# Patient Record
Sex: MALE | Race: WHITE
[De-identification: names, ages, dates, MRNs, and addresses within clinical notes are randomized per-mention and may not be internally consistent; named-entity substitution may affect disease eponyms.]

---

## 2018-10-29 ENCOUNTER — RX RENEWAL (OUTPATIENT)
Age: 68
End: 2018-10-29

## 2018-10-29 PROBLEM — Z00.00 ENCOUNTER FOR PREVENTIVE HEALTH EXAMINATION: Status: ACTIVE | Noted: 2018-10-29

## 2018-12-17 ENCOUNTER — RX RENEWAL (OUTPATIENT)
Age: 68
End: 2018-12-17

## 2019-03-19 ENCOUNTER — RECORD ABSTRACTING (OUTPATIENT)
Age: 69
End: 2019-03-19

## 2019-03-19 DIAGNOSIS — Z78.9 OTHER SPECIFIED HEALTH STATUS: ICD-10-CM

## 2019-03-19 DIAGNOSIS — Z86.79 PERSONAL HISTORY OF OTHER DISEASES OF THE CIRCULATORY SYSTEM: ICD-10-CM

## 2019-03-19 DIAGNOSIS — Z82.5 FAMILY HISTORY OF ASTHMA AND OTHER CHRONIC LOWER RESPIRATORY DISEASES: ICD-10-CM

## 2019-03-19 RX ORDER — ADHESIVE TAPE 3"X 2.3 YD
50 MCG TAPE, NON-MEDICATED TOPICAL
Refills: 0 | Status: ACTIVE | COMMUNITY

## 2019-03-19 RX ORDER — ASPIRIN 81 MG
81 TABLET, DELAYED RELEASE (ENTERIC COATED) ORAL
Refills: 0 | Status: ACTIVE | COMMUNITY

## 2019-03-20 ENCOUNTER — APPOINTMENT (OUTPATIENT)
Dept: ENDOCRINOLOGY | Facility: CLINIC | Age: 69
End: 2019-03-20
Payer: COMMERCIAL

## 2019-03-20 VITALS
HEART RATE: 74 BPM | SYSTOLIC BLOOD PRESSURE: 140 MMHG | WEIGHT: 229 LBS | HEIGHT: 68 IN | BODY MASS INDEX: 34.71 KG/M2 | DIASTOLIC BLOOD PRESSURE: 70 MMHG

## 2019-03-20 DIAGNOSIS — Z87.448 PERSONAL HISTORY OF OTHER DISEASES OF URINARY SYSTEM: ICD-10-CM

## 2019-03-20 DIAGNOSIS — Z85.9 PERSONAL HISTORY OF MALIGNANT NEOPLASM, UNSPECIFIED: ICD-10-CM

## 2019-03-20 LAB
CHOLEST SERPL-MCNC: 152
GLUCOSE BLDC GLUCOMTR-MCNC: 248
HBA1C MFR BLD HPLC: 10.5
LDLC SERPL CALC-MCNC: 81
MICROALBUMIN/CREAT 24H UR-RTO: 7

## 2019-03-20 PROCEDURE — 99214 OFFICE O/P EST MOD 30 MIN: CPT | Mod: 25

## 2019-03-20 PROCEDURE — 82962 GLUCOSE BLOOD TEST: CPT

## 2019-03-20 RX ORDER — TELMISARTAN 40 MG/1
40 TABLET ORAL
Refills: 0 | Status: ACTIVE | COMMUNITY

## 2019-03-20 RX ORDER — AMLODIPINE BESYLATE 10 MG/1
10 TABLET ORAL
Refills: 0 | Status: ACTIVE | COMMUNITY

## 2019-03-20 RX ORDER — CHLORTHALIDONE 50 MG/1
50 TABLET ORAL
Refills: 0 | Status: ACTIVE | COMMUNITY

## 2019-03-20 RX ORDER — METOPROLOL SUCCINATE 100 MG/1
100 TABLET, EXTENDED RELEASE ORAL
Refills: 0 | Status: ACTIVE | COMMUNITY

## 2019-03-20 NOTE — HISTORY OF PRESENT ILLNESS
[FreeTextEntry1] : Patient is seen today for a routine diabetic follow up.\par Quality:  type 2 DM\par Severity:  uncontrolled\par Duration of diabetes:  since around 2008\par Associated Complications/ Symptoms:  nephropathy\par Modifying Factors:  Better with medication\par \par Patient tests blood glucose 1 times per day.  Reviewed log and most BG in the 180- 250 mg/dl range.\par \par Current Diabetic Medication Regimen:\par Tradjenta 5 mg daily\par Glipizide 15 mg in AM\par \par Admits that diet has not been optimal.  Has still been working a lot of overtime.

## 2019-03-20 NOTE — DATA REVIEWED
[FreeTextEntry1] : LABS:\par 3/13/2019:\par Creatinine 1.8\par Glucose 260\par LDL  81\par Triglycerides 230\par HDL  25\par A1c 10.6%\par TSH  2.87\par Urine microalbumin/ Cr  7\par 25(OH)D  33.8

## 2019-03-20 NOTE — REVIEW OF SYSTEMS
[Recent Weight Gain (___ Lbs)] : recent [unfilled] ~Ulb weight gain [Chest Pain] : no chest pain [Shortness Of Breath] : no shortness of breath [Nausea] : no nausea [Polyuria] : no polyuria [Pain/Numbness of Digits] : no pain/numbness of digits

## 2019-03-20 NOTE — ASSESSMENT
[FreeTextEntry1] : 68 year old male with type 2 DM with associated nephropathy, HTN and dyslipidemia.  His glycemic control has worsened and is severely uncontrolled.\par \par 1.  Type 2 DM-  I had a long discussion with patient in regards to the risks of uncontrolled DM.  I have recommended that we intensify therapy and use a long acting GLP-1 agonist +/- insulin therapy.  The patient has outright refused to consider injectable therapies at this time despite the risks of hyperglycemia.  He has agreed to modify his diet, increase SMBG and increase glipizide to 20 mg daily and return to the office in one month.  If his postprandial glucose levels are still consistently above 180 mg/dl, he may be agreeable to injectable therapy at that time.  \par 2.  Dyslipidemia-  will likely improve with better glycemic control.  Repeat lipids in several months\par 2.  HTN-  as per PCP.  Continue ARB.

## 2019-03-20 NOTE — PHYSICAL EXAM
[No Acute Distress] : no acute distress [Well Nourished] : well nourished [Well Developed] : well developed [Normal Sclera/Conjunctiva] : normal sclera/conjunctiva [No Proptosis] : no proptosis [No Neck Mass] : no neck mass was observed [No LAD] : no lymphadenopathy [Thyroid Not Enlarged] : the thyroid was not enlarged [No Thyroid Nodules] : there were no palpable thyroid nodules [Normal Rate and Effort] : normal respiratory rhythm and effort [Normal Rate] : heart rate was normal  [Clear to Auscultation] : lungs were clear to auscultation bilaterally [Normal S1, S2] : normal S1 and S2 [Regular Rhythm] : with a regular rhythm [Murmurs] : no murmurs [Normal Insight/Judgement] : insight and judgment were intact [No Edema] : there was no peripheral edema [Normal Affect] : the affect was normal [Normal Mood] : the mood was normal

## 2019-03-20 NOTE — CONSULT LETTER
[Dear  ___] : Dear  [unfilled], [Courtesy Letter:] : I had the pleasure of seeing your patient, [unfilled], in my office today. [Please see my note below.] : Please see my note below. [Consult Closing:] : Thank you very much for allowing me to participate in the care of this patient.  If you have any questions, please do not hesitate to contact me. [FreeTextEntry3] : Vasiliy Perera MD, FACE\par  [Sincerely,] : Sincerely,

## 2019-04-02 ENCOUNTER — RX RENEWAL (OUTPATIENT)
Age: 69
End: 2019-04-02

## 2019-04-24 ENCOUNTER — APPOINTMENT (OUTPATIENT)
Dept: ENDOCRINOLOGY | Facility: CLINIC | Age: 69
End: 2019-04-24
Payer: COMMERCIAL

## 2019-04-24 ENCOUNTER — RESULT CHARGE (OUTPATIENT)
Age: 69
End: 2019-04-24

## 2019-04-24 VITALS
HEART RATE: 67 BPM | BODY MASS INDEX: 33.49 KG/M2 | DIASTOLIC BLOOD PRESSURE: 70 MMHG | SYSTOLIC BLOOD PRESSURE: 138 MMHG | HEIGHT: 68 IN | WEIGHT: 221 LBS

## 2019-04-24 LAB — GLUCOSE BLDC GLUCOMTR-MCNC: 113

## 2019-04-24 PROCEDURE — 82962 GLUCOSE BLOOD TEST: CPT

## 2019-04-24 PROCEDURE — 99214 OFFICE O/P EST MOD 30 MIN: CPT | Mod: 25

## 2019-04-24 NOTE — HISTORY OF PRESENT ILLNESS
[FreeTextEntry1] : Since last visit pt. has increased daily exercise and has modified his diet resulting in an 8 lbs weight loss. Over the last month blood sugars have improved. No complaints today. \par \par Quality: Type 2 DM\par Severity: controlled \par Duration: 10 years\par Onset: routine labs\par Modifying Factors: Better with diet and exercise\par Associated Symptoms:\par \par Current Regimen:\par Glipizide 20 mg daily \par Tradjenta 5 mg daily\par \par Self blood sugar monitoring: once a day, per logs\par before breakfast and 2 hours after meals - 122, 138, 106, 118, 97, 98, 140, 112, 130, 103\par \par exercise: everyday walk over 8 miles\par \par Diet: limited carbs\par B- coffee\par L- roast beef on white bread\par D- salad with chicken cutlet \par Snacks - crackers with peanut butter \par \par Date of last eye exam: over 1 year (-) diabetic retinopathy \par Date of last foot exam: in office only\par Date of last flu vaccine: no\par Date of last Pneumovax: no

## 2019-04-24 NOTE — REVIEW OF SYSTEMS
[Recent Weight Loss (___ Lbs)] : recent [unfilled] ~Ulb weight loss [Fatigue] : no fatigue [Visual Field Defect] : no visual field defect [Decreased Appetite] : appetite not decreased [Dysphagia] : no dysphagia [Dysphonia] : no dysphonia [Palpitations] : no palpitations [Neck Pain] : no neck pain [Chest Pain] : no chest pain [Constipation] : no constipation [SOB on Exertion] : no shortness of breath during exertion [Polyuria] : no polyuria [Diarrhea] : no diarrhea [Tremors] : no tremors [Headache] : no headaches [Dysuria] : no dysuria [Depression] : no depression [Polydipsia] : no polydipsia [Anxiety] : no anxiety [Cold Intolerance] : cold tolerant [Heat Intolerance] : heat tolerant [Easy Bruising] : no tendency for easy bruising [Swelling] : no swelling

## 2019-04-24 NOTE — PHYSICAL EXAM
[Alert] : alert [No Acute Distress] : no acute distress [Well Nourished] : well nourished [Well Developed] : well developed [Normal Sclera/Conjunctiva] : normal sclera/conjunctiva [EOMI] : extra ocular movement intact [Normal Hearing] : hearing was normal [Supple] : the neck was supple [No LAD] : no lymphadenopathy [Thyroid Not Enlarged] : the thyroid was not enlarged [No Thyroid Nodules] : there were no palpable thyroid nodules [Normal Rate and Effort] : normal respiratory rhythm and effort [No Accessory Muscle Use] : no accessory muscle use [Clear to Auscultation] : lungs were clear to auscultation bilaterally [Normal Rate] : heart rate was normal  [Normal S1, S2] : normal S1 and S2 [Regular Rhythm] : with a regular rhythm [No Edema] : there was no peripheral edema [Pedal Pulses Normal] : the pedal pulses are present [Normal Bowel Sounds] : normal bowel sounds [Not Tender] : non-tender [Soft] : abdomen soft [Normal Gait] : normal gait [No Rash] : no rash [Right Foot Was Examined] : right foot ~C was examined [Left Foot Was Examined] : left foot ~C was examined [Normal] : normal [Full ROM] : with full range of motion [No Motor Deficits] : the motor exam was normal [No Tremors] : no tremors [Oriented x3] : oriented to person, place, and time [Normal Insight/Judgement] : insight and judgment were intact [Normal Mood] : the mood was normal [Acanthosis Nigricans] : no acanthosis nigricans [Diminished Throughout Both Feet] : normal tactile sensation with monofilament testing throughout both feet

## 2019-04-24 NOTE — ASSESSMENT
[FreeTextEntry1] : 67 y/o male with Type 2 DM, Hyperlipidemia, and HTN. Lab reviewed from 3/13/19 - A1C 10.5%, , Creatinine 1.8, chol 152, and Vitamin D 33.8\par \par Plan:\par Creatinine will be repeated before next visit\par  \par Type 2 DM: increased exercise and modify diet - blood sugars post prandial < 150\par - continue current medication regimen: Glipizide 20 mg daily \par Tradjenta 5 mg daily\par - if blood sugar continues to be < 100, call office for medication to be decreased\par - continue self BS monitoring \par - educated on heathy food choices\par - encourage to continue routine exercise\par \par Hyperlipidemia: repeat lipids before next visit, levels will most likely improve since adjustments have been made in exercise and diet regimen\par \par HTN: stable, continue current medication regimen \par \par Labs and Follow up visit in 3 months

## 2019-04-26 ENCOUNTER — RX RENEWAL (OUTPATIENT)
Age: 69
End: 2019-04-26

## 2019-07-17 ENCOUNTER — APPOINTMENT (OUTPATIENT)
Dept: ENDOCRINOLOGY | Facility: CLINIC | Age: 69
End: 2019-07-17
Payer: COMMERCIAL

## 2019-07-17 VITALS
WEIGHT: 217 LBS | OXYGEN SATURATION: 98 % | HEART RATE: 77 BPM | SYSTOLIC BLOOD PRESSURE: 132 MMHG | HEIGHT: 68 IN | DIASTOLIC BLOOD PRESSURE: 70 MMHG | BODY MASS INDEX: 32.89 KG/M2

## 2019-07-17 LAB — GLUCOSE BLDC GLUCOMTR-MCNC: 137

## 2019-07-17 PROCEDURE — 82962 GLUCOSE BLOOD TEST: CPT | Mod: NC

## 2019-07-17 PROCEDURE — 99214 OFFICE O/P EST MOD 30 MIN: CPT | Mod: 25

## 2019-07-17 NOTE — ASSESSMENT
[FreeTextEntry1] : 67 y/o male with Type 2 DM, Hyperlipidemia, and HTN. 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, with chronic elevated creatinine \par \par Plan:\par Type 2 DM: controlled - A1C improved \par - decrease Glipizide to 10 mg in pm\par - continue: Tradjenta 5 mg daily\par - if blood sugar continues to be < 100, call office for medication to be decreased\par - continue self BS monitoring \par - educated on heathy food choices\par - encourage to continue routine exercise\par - educated on the importance of retinopathy screening \par \par Hyperlipidemia: monitor lipids\par - continue to follow low fat diet \par \par HTN: stable, continue current medication regimen \par \par Labs and follow up visit in 3 months with Dr. Perera  [Importance of Diet and Exercise] : importance of diet and exercise to improve glycemic control, achieve weight loss and improve cardiovascular health [Retinopathy Screening] : Patient was referred to ophthalmology for retinopathy screening

## 2019-07-17 NOTE — HISTORY OF PRESENT ILLNESS
[FreeTextEntry1] : Quality: Type 2 DM\par Severity: controlled \par Duration: 10 years\par Onset: routine labs\par Modifying Factors: Better with diet and exercise\par Associated Symptoms: denies neuropathy \par \par Current Regimen:\par Glipizide 20 mg daily \par Tradjenta 5 mg daily\par \par Self blood sugar monitoring: once a day, per logs\par blood sugars is checked at 3pm daily - 122, 124, 120, 118, 122, 128, 90\par \par exercise: everyday walk 7-9 miles\par \par Diet: limited carbs\par B- coffee\par L- roast beef on white bread\par D- salad with chicken cutlet \par Snacks - none \par \par Date of last eye exam: over 1 year (-) diabetic retinopathy \par Date of last foot exam: in office only\par Date of last flu vaccine: no\par Date of last Pneumovax: no

## 2019-07-17 NOTE — REVIEW OF SYSTEMS
[Recent Weight Loss (___ Lbs)] : recent [unfilled] ~Ulb weight loss [Fatigue] : no fatigue [Decreased Appetite] : appetite not decreased [Visual Field Defect] : no visual field defect [Blurry Vision] : no blurred vision [Dysphagia] : no dysphagia [Dysphonia] : no dysphonia [Neck Pain] : no neck pain [Chest Pain] : no chest pain [Palpitations] : no palpitations [SOB on Exertion] : no shortness of breath during exertion [Constipation] : no constipation [Diarrhea] : no diarrhea [Polyuria] : no polyuria [Dysuria] : no dysuria [Headache] : no headaches [Tremors] : no tremors [Depression] : no depression [Anxiety] : no anxiety [Polydipsia] : no polydipsia [Cold Intolerance] : cold tolerant [Heat Intolerance] : heat tolerant [Easy Bruising] : no tendency for easy bruising [Swelling] : no swelling

## 2019-07-17 NOTE — PHYSICAL EXAM
[Alert] : alert [No Acute Distress] : no acute distress [Well Nourished] : well nourished [Well Developed] : well developed [Normal Sclera/Conjunctiva] : normal sclera/conjunctiva [EOMI] : extra ocular movement intact [Normal Hearing] : hearing was normal [Supple] : the neck was supple [No LAD] : no lymphadenopathy [Thyroid Not Enlarged] : the thyroid was not enlarged [No Thyroid Nodules] : there were no palpable thyroid nodules [Normal Rate and Effort] : normal respiratory rhythm and effort [No Accessory Muscle Use] : no accessory muscle use [Clear to Auscultation] : lungs were clear to auscultation bilaterally [Normal Rate] : heart rate was normal  [Normal S1, S2] : normal S1 and S2 [Regular Rhythm] : with a regular rhythm [Pedal Pulses Normal] : the pedal pulses are present [No Edema] : there was no peripheral edema [Normal Bowel Sounds] : normal bowel sounds [Not Tender] : non-tender [Soft] : abdomen soft [Normal Gait] : normal gait [No Rash] : no rash [Acanthosis Nigricans] : no acanthosis nigricans [Right Foot Was Examined] : right foot ~C was examined [Left Foot Was Examined] : left foot ~C was examined [Normal] : normal [Full ROM] : with full range of motion [Diminished Throughout Both Feet] : normal tactile sensation with monofilament testing throughout both feet [No Motor Deficits] : the motor exam was normal [No Tremors] : no tremors [Oriented x3] : oriented to person, place, and time [Normal Insight/Judgement] : insight and judgment were intact [Normal Mood] : the mood was normal

## 2019-08-28 ENCOUNTER — APPOINTMENT (OUTPATIENT)
Dept: ENDOCRINOLOGY | Facility: CLINIC | Age: 69
End: 2019-08-28

## 2019-09-26 ENCOUNTER — RX RENEWAL (OUTPATIENT)
Age: 69
End: 2019-09-26

## 2019-10-22 LAB — MICROALBUMIN/CREAT 24H UR-RTO: 131.73

## 2019-10-23 ENCOUNTER — APPOINTMENT (OUTPATIENT)
Dept: ENDOCRINOLOGY | Facility: CLINIC | Age: 69
End: 2019-10-23
Payer: COMMERCIAL

## 2019-10-23 VITALS
BODY MASS INDEX: 33.04 KG/M2 | OXYGEN SATURATION: 98 % | SYSTOLIC BLOOD PRESSURE: 130 MMHG | HEIGHT: 68 IN | WEIGHT: 218 LBS | DIASTOLIC BLOOD PRESSURE: 70 MMHG | HEART RATE: 65 BPM

## 2019-10-23 LAB — GLUCOSE BLDC GLUCOMTR-MCNC: 147

## 2019-10-23 PROCEDURE — 99214 OFFICE O/P EST MOD 30 MIN: CPT | Mod: 25

## 2019-10-23 PROCEDURE — 82962 GLUCOSE BLOOD TEST: CPT | Mod: NC

## 2019-10-23 NOTE — REVIEW OF SYSTEMS
[Fatigue] : no fatigue [Chest Pain] : no chest pain [Shortness Of Breath] : no shortness of breath [Pain/Numbness of Digits] : no pain/numbness of digits [Nausea] : no nausea

## 2019-10-23 NOTE — CONSULT LETTER
[Dear  ___] : Dear  [unfilled], [Consult Closing:] : Thank you very much for allowing me to participate in the care of this patient.  If you have any questions, please do not hesitate to contact me. [Courtesy Letter:] : I had the pleasure of seeing your patient, [unfilled], in my office today. [Please see my note below.] : Please see my note below. [Sincerely,] : Sincerely, [FreeTextEntry3] : Vasiliy Perera MD, FACE\par

## 2019-10-23 NOTE — ASSESSMENT
[FreeTextEntry1] : 68 year old male with type 2 DM with associated nephropathy, HTN and dyslipidemia.   His glycemic control is acceptable.  \par \par 1.  Type 2 DM-    continue current regimen.\par 2.  Dyslipidemia-  recommended statin therapy, however patient has refused at this time.  \par 2.  HTN-  as per PCP.  Continue ARB.

## 2019-10-23 NOTE — PHYSICAL EXAM
[No Acute Distress] : no acute distress [Well Nourished] : well nourished [Well Developed] : well developed [No Proptosis] : no proptosis [Normal Sclera/Conjunctiva] : normal sclera/conjunctiva [No Neck Mass] : no neck mass was observed [No LAD] : no lymphadenopathy [Thyroid Not Enlarged] : the thyroid was not enlarged [No Thyroid Nodules] : there were no palpable thyroid nodules [Normal Rate and Effort] : normal respiratory rhythm and effort [Clear to Auscultation] : lungs were clear to auscultation bilaterally [Normal Rate] : heart rate was normal  [Regular Rhythm] : with a regular rhythm [Normal S1, S2] : normal S1 and S2 [Murmurs] : no murmurs [No Edema] : there was no peripheral edema [Normal Insight/Judgement] : insight and judgment were intact [Normal Mood] : the mood was normal [Normal Affect] : the affect was normal [Acanthosis Nigricans] : no acanthosis nigricans

## 2019-10-23 NOTE — HISTORY OF PRESENT ILLNESS
[FreeTextEntry1] : Patient is seen today for a routine diabetic follow up.  Also with HTN and hyperlipidemia\par Quality:  type 2 DM\par Severity:  uncontrolled\par Duration of diabetes:  since around 2008\par Associated Complications/ Symptoms:  nephropathy\par Modifying Factors:  Better with medication\par \par Patient tests blood glucose 1 times per day.   Most BG in the 80- 140 mg/dl range.    No recent hypoglycemia\par \par Current Diabetic Medication Regimen:\par Tradjenta 5 mg daily\par Glipizide 10 mg daily (takes in afternoon)\par

## 2020-01-14 ENCOUNTER — RX RENEWAL (OUTPATIENT)
Age: 70
End: 2020-01-14

## 2020-01-22 ENCOUNTER — APPOINTMENT (OUTPATIENT)
Dept: ENDOCRINOLOGY | Facility: CLINIC | Age: 70
End: 2020-01-22
Payer: COMMERCIAL

## 2020-01-22 VITALS
HEART RATE: 64 BPM | SYSTOLIC BLOOD PRESSURE: 136 MMHG | DIASTOLIC BLOOD PRESSURE: 80 MMHG | BODY MASS INDEX: 33.65 KG/M2 | HEIGHT: 68 IN | WEIGHT: 222 LBS

## 2020-01-22 LAB — GLUCOSE BLDC GLUCOMTR-MCNC: 187

## 2020-01-22 PROCEDURE — 99214 OFFICE O/P EST MOD 30 MIN: CPT | Mod: 25

## 2020-01-22 PROCEDURE — 82962 GLUCOSE BLOOD TEST: CPT

## 2020-01-22 NOTE — ASSESSMENT
[Importance of Diet and Exercise] : importance of diet and exercise to improve glycemic control, achieve weight loss and improve cardiovascular health [FreeTextEntry1] : 68 y/o male with Type 2 DM, Hyperlipidemia, and HTN. 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 does not want to increase medication and wants to work on diet and exercise\par - continue Glipizide to 10 mg in pm and Tradjenta 5 mg daily\par - continue self BS monitoring \par - educated on the importance of retinopathy screening \par \par Hyperlipidemia: monitor lipids\par - continue to follow low fat diet \par \par HTN: stable, continue current medication regimen \par \par Labs and follow up visit in 3 months with Dr. Perera

## 2020-01-22 NOTE — HISTORY OF PRESENT ILLNESS
[FreeTextEntry1] : Quality: Type 2 DM\par Severity: moderate \par Duration: 10 years\par Onset: routine labs\par Modifying Factors: Better with diet and exercise\par Associated Symptoms: denies neuropathy \par \par Current Regimen:\par Glipizide 10 mg daily \par Tradjenta 5 mg daily\par \par Self blood sugar monitoring: once a day, per logs\par blood sugars is checked at 3pm daily - 152, 182, 140, 156, 144, 142, 128\par \par exercise: everyday walk 3-4 miles\par \par Diet: \par B- coffee\par L- roast beef on white bread\par D- eating take out a lot \par Snacks - none \par \par Date of last eye exam: over 1 year (-) diabetic retinopathy, needs appointment  \par Date of last foot exam: in office only\par Date of last flu vaccine: no\par Date of last Pneumovax: no

## 2020-01-22 NOTE — PHYSICAL EXAM
[Well Nourished] : well nourished [Alert] : alert [No Acute Distress] : no acute distress [Well Developed] : well developed [Normal Sclera/Conjunctiva] : normal sclera/conjunctiva [Normal Hearing] : hearing was normal [EOMI] : extra ocular movement intact [No LAD] : no lymphadenopathy [Supple] : the neck was supple [Thyroid Not Enlarged] : the thyroid was not enlarged [Normal Rate and Effort] : normal respiratory rhythm and effort [No Thyroid Nodules] : there were no palpable thyroid nodules [No Accessory Muscle Use] : no accessory muscle use [Normal Rate] : heart rate was normal  [Clear to Auscultation] : lungs were clear to auscultation bilaterally [Normal S1, S2] : normal S1 and S2 [Regular Rhythm] : with a regular rhythm [Pedal Pulses Normal] : the pedal pulses are present [No Edema] : there was no peripheral edema [Normal Bowel Sounds] : normal bowel sounds [Soft] : abdomen soft [Not Tender] : non-tender [No Rash] : no rash [Normal Gait] : normal gait [Right Foot Was Examined] : right foot ~C was examined [Left Foot Was Examined] : left foot ~C was examined [Normal] : normal [Full ROM] : with full range of motion [No Tremors] : no tremors [No Motor Deficits] : the motor exam was normal [Oriented x3] : oriented to person, place, and time [Normal Insight/Judgement] : insight and judgment were intact [Normal Mood] : the mood was normal [Acanthosis Nigricans] : no acanthosis nigricans [Diminished Throughout Both Feet] : normal tactile sensation with monofilament testing throughout both feet

## 2020-01-22 NOTE — REVIEW OF SYSTEMS
[Fatigue] : no fatigue [Decreased Appetite] : appetite not decreased [Recent Weight Gain (___ Lbs)] : no recent weight gain [Blurry Vision] : no blurred vision [Visual Field Defect] : no visual field defect [Recent Weight Loss (___ Lbs)] : no recent weight loss [Dysphonia] : no dysphonia [Dysphagia] : no dysphagia [Palpitations] : no palpitations [Neck Pain] : no neck pain [Chest Pain] : no chest pain [Constipation] : no constipation [Polyuria] : no polyuria [Diarrhea] : no diarrhea [Dysuria] : no dysuria [Headache] : no headaches [Tremors] : no tremors [Depression] : no depression [Polydipsia] : no polydipsia [Anxiety] : no anxiety [Easy Bruising] : no tendency for easy bruising [Cold Intolerance] : cold tolerant [Heat Intolerance] : heat tolerant [Swelling] : no swelling [FreeTextEntry2] : weight stable

## 2020-03-13 ENCOUNTER — RX RENEWAL (OUTPATIENT)
Age: 70
End: 2020-03-13

## 2020-06-17 ENCOUNTER — APPOINTMENT (OUTPATIENT)
Dept: ENDOCRINOLOGY | Facility: CLINIC | Age: 70
End: 2020-06-17

## 2020-07-07 ENCOUNTER — RX RENEWAL (OUTPATIENT)
Age: 70
End: 2020-07-07

## 2020-08-27 ENCOUNTER — RX RENEWAL (OUTPATIENT)
Age: 70
End: 2020-08-27

## 2020-09-29 LAB
HBA1C MFR BLD HPLC: 9.7
LDLC SERPL DIRECT ASSAY-MCNC: 110
MICROALBUMIN/CREAT 24H UR-RTO: 4

## 2020-09-30 ENCOUNTER — APPOINTMENT (OUTPATIENT)
Dept: ENDOCRINOLOGY | Facility: CLINIC | Age: 70
End: 2020-09-30
Payer: COMMERCIAL

## 2020-09-30 VITALS
HEIGHT: 68 IN | BODY MASS INDEX: 33.49 KG/M2 | OXYGEN SATURATION: 99 % | HEART RATE: 64 BPM | DIASTOLIC BLOOD PRESSURE: 70 MMHG | SYSTOLIC BLOOD PRESSURE: 124 MMHG | WEIGHT: 221 LBS

## 2020-09-30 LAB — GLUCOSE BLDC GLUCOMTR-MCNC: 294

## 2020-09-30 PROCEDURE — 99214 OFFICE O/P EST MOD 30 MIN: CPT | Mod: 25

## 2020-09-30 PROCEDURE — 82962 GLUCOSE BLOOD TEST: CPT

## 2020-09-30 RX ORDER — ASPIRIN 81 MG/1
81 TABLET, COATED ORAL
Refills: 0 | Status: DISCONTINUED | COMMUNITY
End: 2020-09-30

## 2020-09-30 NOTE — HISTORY OF PRESENT ILLNESS
[FreeTextEntry1] : Follow up DM, HTN and hyperlipidemia\par Quality:  type 2 DM\par Severity:  uncontrolled\par Duration of diabetes:  since around 2008\par Associated Complications/ Symptoms:  nephropathy\par Modifying Factors:  Better with medication\par \par Patient tests blood glucose 1 times per day.     Most BG in the 150- 240 mg/dl range.  \par \par Current Diabetic Medication Regimen:\par Tradjenta 5 mg daily\par Glipizide 10 mg daily   \par  \par Has still been working very long hours.  Thinking about retiring in June of 2021.  Admits to lack of exercise and diet noncompliance.\par Denies any associated polyuria or polydipsia.

## 2020-09-30 NOTE — REVIEW OF SYSTEMS
[Chest Pain] : no chest pain [Recent Weight Loss (___ Lbs)] : no recent weight loss [Recent Weight Gain (___ Lbs)] : no recent weight gain [Shortness Of Breath] : no shortness of breath [Polyuria] : no polyuria [Polydipsia] : no polydipsia [Pain/Numbness of Digits] : no pain/numbness of digits

## 2020-09-30 NOTE — PHYSICAL EXAM
[Healthy Appearance] : healthy appearance [No Acute Distress] : no acute distress [Normal Sclera/Conjunctiva] : normal sclera/conjunctiva [No Proptosis] : no proptosis [No Neck Mass] : no neck mass was observed [No LAD] : no lymphadenopathy [Supple] : the neck was supple [Thyroid Not Enlarged] : the thyroid was not enlarged [No Thyroid Nodules] : no palpable thyroid nodules [Clear to Auscultation] : lungs were clear to auscultation bilaterally [No Respiratory Distress] : no respiratory distress [No Murmurs] : no murmurs [Normal S1, S2] : normal S1 and S2 [Normal Rate] : heart rate was normal [Regular Rhythm] : with a regular rhythm [No Edema] : no peripheral edema [Normal Gait] : normal gait [Normal Affect] : the affect was normal [Normal Mood] : the mood was normal [Normal Insight/Judgement] : insight and judgment were intact [Acanthosis Nigricans] : no acanthosis nigricans

## 2020-09-30 NOTE — CONSULT LETTER
[Dear  ___] : Dear  [unfilled], [Courtesy Letter:] : I had the pleasure of seeing your patient, [unfilled], in my office today. [Please see my note below.] : Please see my note below. [Consult Closing:] : Thank you very much for allowing me to participate in the care of this patient.  If you have any questions, please do not hesitate to contact me. [Sincerely,] : Sincerely, [FreeTextEntry3] : Vasiliy Perera MD, FACE\par

## 2020-09-30 NOTE — ASSESSMENT
[FreeTextEntry1] : 69 year old male with type 2 DM with associated nephropathy, HTN and dyslipidemia.    His diabetes control has worsened significantly.  \par \par 1.  Type 2 DM-   Recommended switching DPPIV-I to Long acting GLP-1 agonist, but patient does not want to start at this time.  Increase Glipizide to 10 mg BID.  ADvised to reduce CHO intake and warned of the risks of continued uncontrolled DM.  \par 2.  Dyslipidemia-  refused statin therapy in the past.   After discussion, willing to try Rosuvastatin 5 mg daily.  \par 2.  HTN-  as per PCP.  Continue ARB.

## 2020-11-25 ENCOUNTER — RX RENEWAL (OUTPATIENT)
Age: 70
End: 2020-11-25

## 2020-12-15 ENCOUNTER — RX RENEWAL (OUTPATIENT)
Age: 70
End: 2020-12-15

## 2021-01-06 ENCOUNTER — APPOINTMENT (OUTPATIENT)
Dept: ENDOCRINOLOGY | Facility: CLINIC | Age: 71
End: 2021-01-06

## 2021-02-24 ENCOUNTER — RX RENEWAL (OUTPATIENT)
Age: 71
End: 2021-02-24

## 2021-04-07 ENCOUNTER — APPOINTMENT (OUTPATIENT)
Dept: ENDOCRINOLOGY | Facility: CLINIC | Age: 71
End: 2021-04-07

## 2021-06-15 LAB
HBA1C MFR BLD HPLC: 10.7
LDLC SERPL DIRECT ASSAY-MCNC: 66
MICROALBUMIN/CREAT 24H UR-RTO: 15

## 2021-06-16 ENCOUNTER — APPOINTMENT (OUTPATIENT)
Dept: ENDOCRINOLOGY | Facility: CLINIC | Age: 71
End: 2021-06-16
Payer: COMMERCIAL

## 2021-06-16 VITALS
DIASTOLIC BLOOD PRESSURE: 80 MMHG | HEART RATE: 73 BPM | OXYGEN SATURATION: 98 % | HEIGHT: 68 IN | SYSTOLIC BLOOD PRESSURE: 140 MMHG | WEIGHT: 224 LBS | BODY MASS INDEX: 33.95 KG/M2

## 2021-06-16 LAB — GLUCOSE BLDC GLUCOMTR-MCNC: 232

## 2021-06-16 PROCEDURE — 99072 ADDL SUPL MATRL&STAF TM PHE: CPT

## 2021-06-16 PROCEDURE — 99214 OFFICE O/P EST MOD 30 MIN: CPT | Mod: 25

## 2021-06-16 PROCEDURE — 82962 GLUCOSE BLOOD TEST: CPT

## 2021-06-16 PROCEDURE — 96372 THER/PROPH/DIAG INJ SC/IM: CPT

## 2021-06-16 RX ORDER — LINAGLIPTIN 5 MG/1
5 TABLET, FILM COATED ORAL
Qty: 30 | Refills: 2 | Status: DISCONTINUED | COMMUNITY
Start: 2018-12-17 | End: 2021-06-16

## 2021-06-16 NOTE — HISTORY OF PRESENT ILLNESS
[FreeTextEntry1] : Quality: Type 2 DM\par Severity: moderate \par Duration: since 2008 \par Onset: routine labs\par Modifying Factors: Better with medication \par Associated Symptoms: nephropathy \par \par Current Regimen:\par Glipizide 10 mg BID\par Tradjenta 5 mg daily\par \par Self blood sugar monitoring: once a day, per logs\par blood sugars is checked at 3pm daily - 177, 176, 174, 180, 184, 182\par Current \par \par exercise: everyday walk 3-4 miles\par \par Diet: \par B- coffee\par L- roast beef on white bread\par D- eating take out a lot \par Snacks - none \par \par Date of last eye exam: over 1 year (-) diabetic retinopathy, needs appointment  \par Date of last foot exam: in office only\par Date of last flu vaccine: no\par Date of last Pneumovax: no

## 2021-06-16 NOTE — ASSESSMENT
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, with chronic elevated creatinine \par \par Plan:\par Type 2 DM: A1C worse \par - start Trulicity 0.75 mg weekly - denies hx of pancreatitis and/or Medullary Thyroid Cancer, reviewed s/e \par - discontinue Tradjenta\par - continue Glipizide \par - continue self BG monitoring \par - educated on healthy food choices\par - encouraged to increase routine exercise, 150 minutes a week \par - educated on the importance of retinopathy screening \par - repeat A1C in 3 months \par \par Hyperlipidemia: monitor lipids, continue statin \par - continue to follow low fat diet \par - repeat lipids in 3 months \par \par HTN: BP acceptable, continue ARB\par \par Follow up visit in 3 months with Dr. Perera  [Importance of Diet and Exercise] : importance of diet and exercise to improve glycemic control, achieve weight loss and improve cardiovascular health

## 2021-06-16 NOTE — REVIEW OF SYSTEMS
[Recent Weight Gain (___ Lbs)] : recent weight gain: [unfilled] lbs [Fatigue] : no fatigue [Decreased Appetite] : appetite not decreased [Visual Field Defect] : no visual field defect [Blurred Vision] : no blurred vision [Dysphagia] : no dysphagia [Neck Pain] : no neck pain [Dysphonia] : no dysphonia [Chest Pain] : no chest pain [Palpitations] : no palpitations [Constipation] : no constipation [Diarrhea] : no diarrhea [Polyuria] : no polyuria [Dysuria] : no dysuria [Headaches] : no headaches [Tremors] : no tremors [Depression] : no depression [Anxiety] : no anxiety [Polydipsia] : no polydipsia

## 2021-06-16 NOTE — PHYSICAL EXAM
[Alert] : alert [Well Nourished] : well nourished [No Acute Distress] : no acute distress [Well Developed] : well developed [Normal Sclera/Conjunctiva] : normal sclera/conjunctiva [EOMI] : extra ocular movement intact [No LAD] : no lymphadenopathy [Thyroid Not Enlarged] : the thyroid was not enlarged [No Thyroid Nodules] : no palpable thyroid nodules [No Respiratory Distress] : no respiratory distress [No Accessory Muscle Use] : no accessory muscle use [Normal Rate and Effort] : normal respiratory rate and effort [Clear to Auscultation] : lungs were clear to auscultation bilaterally [Normal S1, S2] : normal S1 and S2 [Normal Rate] : heart rate was normal [Regular Rhythm] : with a regular rhythm [No Edema] : no peripheral edema [Normal Bowel Sounds] : normal bowel sounds [Not Tender] : non-tender [Soft] : abdomen soft [Normal Gait] : normal gait [No Rash] : no rash [Acanthosis Nigricans] : no acanthosis nigricans [Foot Ulcers] : no foot ulcers [Normal] : normal [Full ROM] : with full range of motion [Diminished Throughout Both Feet] : normal tactile sensation with monofilament testing throughout both feet [No Motor Deficits] : the motor exam was normal [No Tremors] : no tremors [Oriented x3] : oriented to person, place, and time [Normal Affect] : the affect was normal [Normal Insight/Judgement] : insight and judgment were intact [Normal Mood] : the mood was normal

## 2021-08-08 ENCOUNTER — RX RENEWAL (OUTPATIENT)
Age: 71
End: 2021-08-08

## 2021-09-14 LAB
HBA1C MFR BLD HPLC: 8.8
LDLC SERPL CALC-MCNC: 45
MICROALBUMIN/CREAT 24H UR-RTO: 11

## 2021-09-15 ENCOUNTER — APPOINTMENT (OUTPATIENT)
Dept: ENDOCRINOLOGY | Facility: CLINIC | Age: 71
End: 2021-09-15
Payer: COMMERCIAL

## 2021-09-15 VITALS
HEIGHT: 68 IN | OXYGEN SATURATION: 98 % | BODY MASS INDEX: 33.65 KG/M2 | WEIGHT: 222 LBS | DIASTOLIC BLOOD PRESSURE: 70 MMHG | HEART RATE: 69 BPM | SYSTOLIC BLOOD PRESSURE: 142 MMHG

## 2021-09-15 LAB — GLUCOSE BLDC GLUCOMTR-MCNC: 136

## 2021-09-15 PROCEDURE — 99214 OFFICE O/P EST MOD 30 MIN: CPT | Mod: 25

## 2021-09-15 PROCEDURE — 82962 GLUCOSE BLOOD TEST: CPT | Mod: NC

## 2021-09-15 NOTE — ASSESSMENT
[FreeTextEntry1] : 70 year old male with type 2 DM with associated nephropathy, HTN and dyslipidemia.    His diabetes control is improving, but still above goal.  \par \par 1.  Type 2 DM-    Patient is confident now that he is retired he can substantially improve diet.  Will continue current dose of Trulicity and Glipizide.  If next A1c is not to goal, consider increasing Trulicity.  Could also consider SGLT-2 in future due to indication for CKD.  \par 2.  Dyslipidemia-   Continue Rosuvastatin 5 mg daily.  \par 2.  HTN-  as per PCP.  Continue ARB.

## 2021-09-15 NOTE — REVIEW OF SYSTEMS
[Recent Weight Loss (___ Lbs)] : recent weight loss: [unfilled] lbs [Chest Pain] : no chest pain [Shortness Of Breath] : no shortness of breath [Abdominal Pain] : no abdominal pain [Pain/Numbness of Digits] : no pain/numbness of digits

## 2021-09-15 NOTE — HISTORY OF PRESENT ILLNESS
[FreeTextEntry1] : Follow up DM, HTN and hyperlipidemia\par \par Quality:  type 2 DM\par Severity:  uncontrolled\par Duration of diabetes:  since around 2008\par Associated Complications/ Symptoms:  nephropathy\par Modifying Factors:  Better with medication\par \par Patient tests blood glucose 1 times per day.     \par \par Current Diabetic Medication Regimen:\par Trulicity 1.5 mg qweek (started in 6/2021)-  reported abdominal pain after starting, now resolved.  Reports good appetite suppression.  \par Glipizide 10 mg daily   \par  \par  Recently had episode of Shingles. \par Retired this week so will be making changes in diet.

## 2021-09-15 NOTE — PHYSICAL EXAM
[Healthy Appearance] : healthy appearance [No Acute Distress] : no acute distress [Normal Sclera/Conjunctiva] : normal sclera/conjunctiva [No Proptosis] : no proptosis [No Respiratory Distress] : no respiratory distress [Clear to Auscultation] : lungs were clear to auscultation bilaterally [Normal S1, S2] : normal S1 and S2 [No Murmurs] : no murmurs [Normal Rate] : heart rate was normal [Regular Rhythm] : with a regular rhythm [No Edema] : no peripheral edema [Normal Gait] : normal gait [Right Foot Was Examined] : right foot ~C was examined [Left Foot Was Examined] : left foot ~C was examined [Normal] : normal [1+] : 1+ in the dorsalis pedis [Normal Affect] : the affect was normal [Normal Insight/Judgement] : insight and judgment were intact [Normal Mood] : the mood was normal [Acanthosis Nigricans] : no acanthosis nigricans [Diminished Throughout Both Feet] : normal tactile sensation with monofilament testing throughout both feet

## 2022-01-12 ENCOUNTER — RX RENEWAL (OUTPATIENT)
Age: 72
End: 2022-01-12

## 2022-01-24 ENCOUNTER — APPOINTMENT (OUTPATIENT)
Dept: ENDOCRINOLOGY | Facility: CLINIC | Age: 72
End: 2022-01-24
Payer: MEDICARE

## 2022-01-24 VITALS
HEIGHT: 68 IN | BODY MASS INDEX: 33.04 KG/M2 | SYSTOLIC BLOOD PRESSURE: 130 MMHG | HEART RATE: 95 BPM | WEIGHT: 218 LBS | DIASTOLIC BLOOD PRESSURE: 80 MMHG

## 2022-01-24 LAB — GLUCOSE BLDC GLUCOMTR-MCNC: 169

## 2022-01-24 PROCEDURE — 82962 GLUCOSE BLOOD TEST: CPT

## 2022-01-24 PROCEDURE — 99214 OFFICE O/P EST MOD 30 MIN: CPT | Mod: 25

## 2022-01-24 NOTE — DATA REVIEWED
[FreeTextEntry1] :  LABS:\par 1/20/2022:\par Creatinine 2 *GFR  35)\par A1c 8.1%\par UACR 32\par LDL 60\par TSH 5.31\par Free T4 1.25\par \par 9/10/2021:\par Creatinine 1.56 (GFR  44)\par

## 2022-01-24 NOTE — REVIEW OF SYSTEMS
[Recent Weight Loss (___ Lbs)] : recent weight loss: [unfilled] lbs [Joint Pain] : joint pain [Chest Pain] : no chest pain [Shortness Of Breath] : no shortness of breath [Nausea] : no nausea

## 2022-01-24 NOTE — ASSESSMENT
[FreeTextEntry1] : 71 year old male with type 2 DM with associated nephropathy, HTN and dyslipidemia.     His glycemic control is improving, but is still above goal.   \par \par 1.  Type 2 DM-   Recommended increase in Trulicity dose to improve A1c, but patient wants to avoid increase in medication until he improves diet and increases exercise. Could consider addition of SGLT-2 inhibitor in future if GFR can tolerate due to associated CKD.    Repeat labs in 4 months.  \par 2.  Dyslipidemia-   Continue Rosuvastatin 5 mg daily.  \par 2.  HTN-  as per PCP.  Continue ARB.  
Detail Level: Simple
Additional Notes: Patient consent was obtained to proceed with the visit and recommended plan of care after discussion of all risks and benefits, including the risks of COVID-19 exposure.

## 2022-01-24 NOTE — HISTORY OF PRESENT ILLNESS
[FreeTextEntry1] : Follow up DM, HTN and hyperlipidemia\par \par Quality:  type 2 DM\par Severity:  uncontrolled\par Duration of diabetes:  since around 2008\par Associated Complications/ Symptoms:  nephropathy\par Modifying Factors:  Better with medication\par \par Patient tests blood glucose 1 times per day.     Most fasting BG in the 120- 140 mg/dl range.  \par \par Current Diabetic Medication Regimen:\par Trulicity 1.5 mg qweek (started in 6/2021) \par Glipizide 10 mg BID\par \par Has been less active now that he is retired and has some noncompliance with diet.  \par Will be going to [place] for 2 months. \par  \par

## 2022-06-08 ENCOUNTER — APPOINTMENT (OUTPATIENT)
Dept: ENDOCRINOLOGY | Facility: CLINIC | Age: 72
End: 2022-06-08
Payer: MEDICARE

## 2022-06-08 VITALS
SYSTOLIC BLOOD PRESSURE: 136 MMHG | BODY MASS INDEX: 33.19 KG/M2 | WEIGHT: 219 LBS | DIASTOLIC BLOOD PRESSURE: 74 MMHG | HEIGHT: 68 IN | HEART RATE: 81 BPM | OXYGEN SATURATION: 96 %

## 2022-06-08 LAB
GLUCOSE BLDC GLUCOMTR-MCNC: 148
HBA1C MFR BLD HPLC: 7.6
LDLC SERPL DIRECT ASSAY-MCNC: 48
MICROALBUMIN/CREAT 24H UR-RTO: 18

## 2022-06-08 PROCEDURE — 82962 GLUCOSE BLOOD TEST: CPT

## 2022-06-08 PROCEDURE — 99214 OFFICE O/P EST MOD 30 MIN: CPT | Mod: 25

## 2022-06-08 RX ORDER — ROSUVASTATIN CALCIUM 5 MG/1
5 TABLET, FILM COATED ORAL
Qty: 90 | Refills: 1 | Status: DISCONTINUED | COMMUNITY
Start: 2020-09-30 | End: 2022-06-08

## 2022-06-08 RX ORDER — DULAGLUTIDE 1.5 MG/.5ML
1.5 INJECTION, SOLUTION SUBCUTANEOUS
Qty: 3 | Refills: 1 | Status: DISCONTINUED | COMMUNITY
Start: 2021-06-16 | End: 2022-06-08

## 2022-06-08 RX ORDER — ROSUVASTATIN CALCIUM 10 MG/1
10 TABLET, FILM COATED ORAL
Qty: 90 | Refills: 0 | Status: ACTIVE | COMMUNITY
Start: 2022-01-11

## 2022-06-08 NOTE — REVIEW OF SYSTEMS
[Fatigue] : no fatigue [Recent Weight Gain (___ Lbs)] : no recent weight gain [Recent Weight Loss (___ Lbs)] : no recent weight loss [Chest Pain] : no chest pain [Shortness Of Breath] : no shortness of breath [Nausea] : no nausea [Constipation] : no constipation

## 2022-06-08 NOTE — ASSESSMENT
[FreeTextEntry1] : 71 year old male with type 2 DM with associated nephropathy, HTN and dyslipidemia.     His glycemic control is improving, but is still above goal.   \par \par 1.  Type 2 DM-    Will increase Trulicity to 3 mg qweek in attempt to lower A1c.  Continue Glipizide.   \par 2.  Dyslipidemia-   Continue Rosuvastatin 5 mg daily.  \par 2.  HTN-   Continue ARB.  \par 4.  Elevated TSH-  mild and asymptomatic.  Repeat TFTs along with thyroid antibodies on next labs.

## 2022-06-08 NOTE — HISTORY OF PRESENT ILLNESS
[FreeTextEntry1] : Follow up DM, HTN and hyperlipidemia\par \par Quality:  type 2 DM\par Severity:  uncontrolled\par Duration of diabetes:  since around 2008\par Associated Complications/ Symptoms:  nephropathy\par Modifying Factors:  Better with medication\par \par Patient tests blood glucose 1 time per day.     Most BG in the 110- 140 mg/dl range.   \par \par Current Diabetic Medication Regimen:\par Trulicity 1.5 mg qweek\par Glipizide 10 mg BID\par \par \par  \par

## 2022-07-18 ENCOUNTER — NON-APPOINTMENT (OUTPATIENT)
Age: 72
End: 2022-07-18

## 2022-10-21 LAB
HBA1C MFR BLD HPLC: 7.2
LDLC SERPL DIRECT ASSAY-MCNC: 61
MICROALBUMIN/CREAT UR-RTO: 38
TSH SERPL-ACNC: 6.42

## 2022-10-24 ENCOUNTER — APPOINTMENT (OUTPATIENT)
Dept: ENDOCRINOLOGY | Facility: CLINIC | Age: 72
End: 2022-10-24

## 2022-10-24 VITALS
SYSTOLIC BLOOD PRESSURE: 138 MMHG | OXYGEN SATURATION: 98 % | DIASTOLIC BLOOD PRESSURE: 88 MMHG | HEIGHT: 68 IN | HEART RATE: 71 BPM | WEIGHT: 213 LBS | BODY MASS INDEX: 32.28 KG/M2

## 2022-10-24 LAB — GLUCOSE BLDC GLUCOMTR-MCNC: 171

## 2022-10-24 PROCEDURE — 99214 OFFICE O/P EST MOD 30 MIN: CPT | Mod: 25

## 2022-10-24 PROCEDURE — 82962 GLUCOSE BLOOD TEST: CPT

## 2022-10-24 NOTE — ASSESSMENT
[FreeTextEntry1] : 71 year old male with type 2 DM with associated nephropathy, HTN and dyslipidemia.     His glycemic control is improving.\par \par 1.  Type 2 DM-    Continue Glipizide and resume Trulicity.  If diarrhea continues, consider change to Ozempic, which was discussed at this visit.  Consider SGLT2-I in future due to his underlying CKD.   \par 2.  Dyslipidemia-   Continue Rosuvastatin.\par 2.  HTN-   Continue ARB.  \par 4.  Elevated TSH-  Does not want to try Rx at this time.   Continue to follow.  No underlying AITD.  \par \par Follow up in 4 months.

## 2022-10-24 NOTE — REVIEW OF SYSTEMS
[Recent Weight Loss (___ Lbs)] : recent weight loss: [unfilled] lbs [Nausea] : no nausea [Diarrhea] : diarrhea

## 2022-10-24 NOTE — HISTORY OF PRESENT ILLNESS
[FreeTextEntry1] : Follow up DM, HTN and hyperlipidemia\par \par Quality:  type 2 DM\par Severity:  uncontrolled\par Duration of diabetes:  since around 2008\par Associated Complications/ Symptoms:  nephropathy\par Modifying Factors:  Better with medication\par \par Patient tests blood glucose 1 time per day.      \par \par Current Diabetic Medication Regimen:\par Trulicity 1.5 mg qweek (intolerant to higher dose due to nausea and diarrhea)- ran out of this for last 6 weeks.-  still gets diarrhea on lower dose.  \par Glipizide 10 mg BID\par \par Complains of fatigue lately.  \par Has lost some weight.    \par  \par

## 2022-10-24 NOTE — PHYSICAL EXAM
[Healthy Appearance] : healthy appearance [No Acute Distress] : no acute distress [Normal Sclera/Conjunctiva] : normal sclera/conjunctiva [No Proptosis] : no proptosis [No Neck Mass] : no neck mass was observed [No LAD] : no lymphadenopathy [Supple] : the neck was supple [Thyroid Not Enlarged] : the thyroid was not enlarged [No Thyroid Nodules] : no palpable thyroid nodules [No Respiratory Distress] : no respiratory distress [Clear to Auscultation] : lungs were clear to auscultation bilaterally [Normal S1, S2] : normal S1 and S2 [No Murmurs] : no murmurs [Normal Rate] : heart rate was normal [Regular Rhythm] : with a regular rhythm [Normal Gait] : normal gait [Acanthosis Nigricans] : no acanthosis nigricans [Normal Affect] : the affect was normal [Normal Insight/Judgement] : insight and judgment were intact [Normal Mood] : the mood was normal

## 2022-10-24 NOTE — DATA REVIEWED
[FreeTextEntry1] :  LABS:\par 10/19/2022:\par Creatinine 1.79\par TPO Ab < 15\par Tg Ab 18\par \par 1/20/2022:\par Creatinine 2  (GFR  35)\par A1c 8.1%\par UACR 32\par LDL 60\par TSH 5.31\par Free T4 1.25\par \par 9/10/2021:\par Creatinine 1.56 (GFR  44)\par

## 2022-10-27 ENCOUNTER — NON-APPOINTMENT (OUTPATIENT)
Age: 72
End: 2022-10-27

## 2023-04-04 ENCOUNTER — NON-APPOINTMENT (OUTPATIENT)
Age: 73
End: 2023-04-04

## 2023-04-04 ENCOUNTER — APPOINTMENT (OUTPATIENT)
Dept: ENDOCRINOLOGY | Facility: CLINIC | Age: 73
End: 2023-04-04

## 2023-04-24 LAB
HBA1C MFR BLD HPLC: 6.6
LDLC SERPL CALC-MCNC: 56

## 2023-04-25 ENCOUNTER — TRANSCRIPTION ENCOUNTER (OUTPATIENT)
Age: 73
End: 2023-04-25

## 2023-04-25 ENCOUNTER — APPOINTMENT (OUTPATIENT)
Dept: ENDOCRINOLOGY | Facility: CLINIC | Age: 73
End: 2023-04-25
Payer: MEDICARE

## 2023-04-25 ENCOUNTER — NON-APPOINTMENT (OUTPATIENT)
Age: 73
End: 2023-04-25

## 2023-04-25 VITALS
OXYGEN SATURATION: 98 % | DIASTOLIC BLOOD PRESSURE: 82 MMHG | HEIGHT: 68 IN | SYSTOLIC BLOOD PRESSURE: 148 MMHG | WEIGHT: 208 LBS | BODY MASS INDEX: 31.52 KG/M2 | HEART RATE: 70 BPM

## 2023-04-25 LAB — GLUCOSE BLDC GLUCOMTR-MCNC: 120

## 2023-04-25 PROCEDURE — 82962 GLUCOSE BLOOD TEST: CPT

## 2023-04-25 PROCEDURE — 99214 OFFICE O/P EST MOD 30 MIN: CPT | Mod: 25

## 2023-04-25 NOTE — ASSESSMENT
[FreeTextEntry1] : 72 year old male with type 2 DM with associated nephropathy, HTN and dyslipidemia here for follow up.  His diabetes control is improving.  \par \par 1.  Type 2 DM-    Continue Glipizide and Trulicity.  Consider SGLT2-I in future due to his underlying CKD.   \par 2.  Dyslipidemia-   Continue Rosuvastatin.\par 2.  HTN-   Continue ARB.  \par 4.  Elevated TSH-   Resolved without intervention.  No underlying AITD.  Continue to monitor.\par \par Follow up in 6 months.

## 2023-04-25 NOTE — DATA REVIEWED
[FreeTextEntry1] :  LABS:\par 4/24/2023:\par Creatinine 2.03\par TSH 3.49\par Free T4  1.22\par \par 10/19/2022:\par Creatinine 1.79\par TPO Ab < 15\par Tg Ab 18\par \par 1/20/2022:\par Creatinine 2  (GFR  35)\par A1c 8.1%\par UACR 32\par LDL 60\par TSH 5.31\par Free T4 1.25\par \par 9/10/2021:\par Creatinine 1.56 (GFR  44)\par

## 2023-04-25 NOTE — HISTORY OF PRESENT ILLNESS
[FreeTextEntry1] : Follow up DM, HTN and hyperlipidemia\par \par Quality:  type 2 DM\par Severity:  uncontrolled\par Duration of diabetes:  since around 2008\par Associated Complications/ Symptoms:  nephropathy\par Modifying Factors:  Better with medication\par \par Patient tests blood glucose 1 time per day.      \par \par Current Diabetic Medication Regimen:\par Trulicity 1.5 mg qweek (intolerant to higher dose due to nausea and diarrhea)\par Glipizide 10 mg BID\par \par Has lost some weight since last visit.  \par  \par

## 2023-10-26 ENCOUNTER — APPOINTMENT (OUTPATIENT)
Dept: ENDOCRINOLOGY | Facility: CLINIC | Age: 73
End: 2023-10-26
Payer: MEDICARE

## 2023-10-26 ENCOUNTER — RESULT CHARGE (OUTPATIENT)
Age: 73
End: 2023-10-26

## 2023-10-26 VITALS
OXYGEN SATURATION: 98 % | DIASTOLIC BLOOD PRESSURE: 80 MMHG | HEIGHT: 68 IN | BODY MASS INDEX: 32.17 KG/M2 | RESPIRATION RATE: 16 BRPM | TEMPERATURE: 97 F | SYSTOLIC BLOOD PRESSURE: 140 MMHG | HEART RATE: 70 BPM | WEIGHT: 212.25 LBS

## 2023-10-26 DIAGNOSIS — E07.9 DISORDER OF THYROID, UNSPECIFIED: ICD-10-CM

## 2023-10-26 LAB
GLUCOSE BLDC GLUCOMTR-MCNC: 142
HBA1C MFR BLD HPLC: 6.8
LDLC SERPL DIRECT ASSAY-MCNC: 55
MICROALBUMIN/CREAT 24H UR-RTO: 55

## 2023-10-26 PROCEDURE — 99214 OFFICE O/P EST MOD 30 MIN: CPT

## 2024-01-10 ENCOUNTER — RX RENEWAL (OUTPATIENT)
Age: 74
End: 2024-01-10

## 2024-01-10 RX ORDER — GLIPIZIDE 5 MG/1
5 TABLET ORAL
Qty: 360 | Refills: 1 | Status: ACTIVE | COMMUNITY
Start: 2018-10-29 | End: 1900-01-01

## 2024-01-29 ENCOUNTER — TRANSCRIPTION ENCOUNTER (OUTPATIENT)
Age: 74
End: 2024-01-29

## 2024-04-28 LAB
HBA1C MFR BLD HPLC: 6.3
LDLC SERPL DIRECT ASSAY-MCNC: 58

## 2024-05-02 ENCOUNTER — RX RENEWAL (OUTPATIENT)
Age: 74
End: 2024-05-02

## 2024-05-02 ENCOUNTER — APPOINTMENT (OUTPATIENT)
Dept: ENDOCRINOLOGY | Facility: CLINIC | Age: 74
End: 2024-05-02
Payer: MEDICARE

## 2024-05-02 VITALS
HEART RATE: 70 BPM | TEMPERATURE: 98 F | WEIGHT: 204 LBS | OXYGEN SATURATION: 98 % | SYSTOLIC BLOOD PRESSURE: 130 MMHG | RESPIRATION RATE: 14 BRPM | HEIGHT: 68 IN | BODY MASS INDEX: 30.92 KG/M2 | DIASTOLIC BLOOD PRESSURE: 70 MMHG

## 2024-05-02 DIAGNOSIS — I10 ESSENTIAL (PRIMARY) HYPERTENSION: ICD-10-CM

## 2024-05-02 DIAGNOSIS — E11.29 TYPE 2 DIABETES MELLITUS WITH OTHER DIABETIC KIDNEY COMPLICATION: ICD-10-CM

## 2024-05-02 DIAGNOSIS — E78.2 MIXED HYPERLIPIDEMIA: ICD-10-CM

## 2024-05-02 LAB — GLUCOSE BLDC GLUCOMTR-MCNC: 113

## 2024-05-02 PROCEDURE — 99214 OFFICE O/P EST MOD 30 MIN: CPT

## 2024-05-02 PROCEDURE — G2211 COMPLEX E/M VISIT ADD ON: CPT

## 2024-05-02 PROCEDURE — 82962 GLUCOSE BLOOD TEST: CPT

## 2024-05-02 RX ORDER — DULAGLUTIDE 1.5 MG/.5ML
1.5 INJECTION, SOLUTION SUBCUTANEOUS
Qty: 3 | Refills: 1 | Status: DISCONTINUED | COMMUNITY
Start: 2022-06-08 | End: 2024-05-02

## 2024-05-02 RX ORDER — LINAGLIPTIN 5 MG/1
5 TABLET, FILM COATED ORAL
Qty: 90 | Refills: 1 | Status: ACTIVE | COMMUNITY
Start: 2024-01-30 | End: 1900-01-01

## 2024-05-02 NOTE — ASSESSMENT
[FreeTextEntry1] : 73 year old male with type 2 DM with associated nephropathy, HTN and dyslipidemia here for follow up.   His diabetes is well controlled.     1. Type 2 DM-  Due to good control, will continue current dose of Tradjenta and Glipizide.  Does not want to try SGLT2-I due to cost.  I have advised renal evaluation due to increasing creatinine level.    2. Dyslipidemia- Continue Rosuvastatin. 3. HTN- Continue ARB. 4.  Elevated TSH-  mild and asymptomatic.  No treatment required.  No sign of AITD on labs in the past.   Continue to monitor.     Follow up in 6 months.

## 2024-05-02 NOTE — DATA REVIEWED
[FreeTextEntry1] :  LABS: 4/24/2023: Creatinine 2.03 TSH 3.49 Free T4  1.22  10/19/2022: Creatinine 1.79 TPO Ab < 15 Tg Ab 18  1/20/2022: Creatinine 2  (GFR  35) A1c 8.1% UACR 32 LDL 60 TSH 5.31 Free T4 1.25

## 2024-05-02 NOTE — HISTORY OF PRESENT ILLNESS
[FreeTextEntry1] : Follow up DM, HTN and hyperlipidemia  Quality:  type 2 DM Severity:  uncontrolled Duration of diabetes:  since around 2008 Associated Complications/ Symptoms:  nephropathy Modifying Factors:  Better with medication  Patient tests blood glucose 1 time per day.       No recent hypoglycemia.    Current Diabetic Medication Regimen: Glipizide 10 mg BID Tradjenta 5 mg daily Ran out of Trulicity in [place] due to shortages at pharmacies, so went back on Tradjenta 5 mg daily  (previously could not tolerate higher than 1.5 mg dose of Trulicity due to nausea on higher doses).    Has been exercising a lot and lost some weight.      Admits to some dietary indiscretion.

## 2024-05-17 RX ORDER — LANCETS 33 GAUGE
EACH MISCELLANEOUS
Qty: 100 | Refills: 1 | Status: ACTIVE | COMMUNITY
Start: 2024-05-17 | End: 1900-01-01

## 2024-05-21 RX ORDER — BLOOD SUGAR DIAGNOSTIC
STRIP MISCELLANEOUS
Qty: 100 | Refills: 3 | Status: DISCONTINUED | COMMUNITY
Start: 2022-11-01 | End: 2024-05-21

## 2024-06-21 RX ORDER — BLOOD-GLUCOSE METER
W/DEVICE EACH MISCELLANEOUS
Qty: 1 | Refills: 0 | Status: ACTIVE | COMMUNITY
Start: 2024-05-17

## 2024-06-21 RX ORDER — BLOOD SUGAR DIAGNOSTIC
STRIP MISCELLANEOUS
Qty: 1 | Refills: 3 | Status: DISCONTINUED | COMMUNITY
Start: 2024-06-17 | End: 2024-06-21

## 2024-06-21 RX ORDER — BLOOD SUGAR DIAGNOSTIC
STRIP MISCELLANEOUS
Qty: 100 | Refills: 3 | Status: ACTIVE | COMMUNITY
Start: 2024-05-17

## 2024-09-05 ENCOUNTER — NON-APPOINTMENT (OUTPATIENT)
Age: 74
End: 2024-09-05

## 2024-10-15 ENCOUNTER — RX RENEWAL (OUTPATIENT)
Age: 74
End: 2024-10-15

## 2024-12-03 ENCOUNTER — APPOINTMENT (OUTPATIENT)
Dept: ENDOCRINOLOGY | Facility: CLINIC | Age: 74
End: 2024-12-03
Payer: MEDICARE

## 2024-12-03 VITALS
SYSTOLIC BLOOD PRESSURE: 124 MMHG | HEART RATE: 60 BPM | HEIGHT: 68 IN | WEIGHT: 205 LBS | BODY MASS INDEX: 31.07 KG/M2 | OXYGEN SATURATION: 99 % | RESPIRATION RATE: 16 BRPM | DIASTOLIC BLOOD PRESSURE: 68 MMHG

## 2024-12-03 DIAGNOSIS — E78.2 MIXED HYPERLIPIDEMIA: ICD-10-CM

## 2024-12-03 DIAGNOSIS — I10 ESSENTIAL (PRIMARY) HYPERTENSION: ICD-10-CM

## 2024-12-03 DIAGNOSIS — E11.29 TYPE 2 DIABETES MELLITUS WITH OTHER DIABETIC KIDNEY COMPLICATION: ICD-10-CM

## 2024-12-03 DIAGNOSIS — E07.9 DISORDER OF THYROID, UNSPECIFIED: ICD-10-CM

## 2024-12-03 LAB
GLUCOSE BLDC GLUCOMTR-MCNC: 62
HBA1C MFR BLD HPLC: 6.1
LDLC SERPL DIRECT ASSAY-MCNC: 62
MICROALBUMIN/CREAT 24H UR-RTO: 88
TSH SERPL-ACNC: 4.78

## 2024-12-03 PROCEDURE — G2211 COMPLEX E/M VISIT ADD ON: CPT

## 2024-12-03 PROCEDURE — 99214 OFFICE O/P EST MOD 30 MIN: CPT

## 2025-05-13 ENCOUNTER — APPOINTMENT (OUTPATIENT)
Dept: ENDOCRINOLOGY | Facility: CLINIC | Age: 75
End: 2025-05-13
Payer: MEDICARE

## 2025-05-13 VITALS
RESPIRATION RATE: 16 BRPM | BODY MASS INDEX: 31.37 KG/M2 | WEIGHT: 207 LBS | DIASTOLIC BLOOD PRESSURE: 76 MMHG | HEIGHT: 68 IN | SYSTOLIC BLOOD PRESSURE: 134 MMHG | HEART RATE: 63 BPM | OXYGEN SATURATION: 98 %

## 2025-05-13 DIAGNOSIS — E07.9 DISORDER OF THYROID, UNSPECIFIED: ICD-10-CM

## 2025-05-13 DIAGNOSIS — I10 ESSENTIAL (PRIMARY) HYPERTENSION: ICD-10-CM

## 2025-05-13 DIAGNOSIS — E78.2 MIXED HYPERLIPIDEMIA: ICD-10-CM

## 2025-05-13 DIAGNOSIS — E11.29 TYPE 2 DIABETES MELLITUS WITH OTHER DIABETIC KIDNEY COMPLICATION: ICD-10-CM

## 2025-05-13 LAB
HBA1C MFR BLD HPLC: 6
LDLC SERPL DIRECT ASSAY-MCNC: 64
TSH SERPL-ACNC: 6.37

## 2025-05-13 PROCEDURE — G2211 COMPLEX E/M VISIT ADD ON: CPT

## 2025-05-13 PROCEDURE — 99214 OFFICE O/P EST MOD 30 MIN: CPT

## 2025-05-13 RX ORDER — CHLORTHALIDONE 25 MG/1
25 TABLET ORAL
Refills: 0 | Status: ACTIVE | COMMUNITY

## 2025-05-13 RX ORDER — TAMSULOSIN HCL 0.4 MG
0.4 CAPSULE ORAL
Refills: 0 | Status: ACTIVE | COMMUNITY

## 2025-05-13 RX ORDER — SODIUM ZIRCONIUM CYCLOSILICATE 10 G/10G
10 POWDER, FOR SUSPENSION ORAL
Refills: 0 | Status: ACTIVE | COMMUNITY

## 2025-05-16 ENCOUNTER — RX RENEWAL (OUTPATIENT)
Age: 75
End: 2025-05-16